# Patient Record
Sex: FEMALE | Race: WHITE | Employment: STUDENT | URBAN - METROPOLITAN AREA
[De-identification: names, ages, dates, MRNs, and addresses within clinical notes are randomized per-mention and may not be internally consistent; named-entity substitution may affect disease eponyms.]

---
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
## 2022-03-16 ENCOUNTER — TRANSCRIBE ORDER (OUTPATIENT)
Dept: SCHEDULING | Age: 22
End: 2022-03-16

## 2022-03-16 DIAGNOSIS — E04.9 ENLARGED THYROID: Primary | ICD-10-CM

## 2024-06-12 ENCOUNTER — OFFICE VISIT (OUTPATIENT)
Age: 24
End: 2024-06-12
Payer: COMMERCIAL

## 2024-06-12 VITALS
WEIGHT: 182 LBS | OXYGEN SATURATION: 99 % | HEIGHT: 63 IN | SYSTOLIC BLOOD PRESSURE: 124 MMHG | BODY MASS INDEX: 32.25 KG/M2 | DIASTOLIC BLOOD PRESSURE: 80 MMHG

## 2024-06-12 DIAGNOSIS — R55 SYNCOPE AND COLLAPSE: Primary | ICD-10-CM

## 2024-06-12 DIAGNOSIS — Z76.89 ENCOUNTER TO ESTABLISH CARE: ICD-10-CM

## 2024-06-12 DIAGNOSIS — F41.9 ANXIETY: ICD-10-CM

## 2024-06-12 PROCEDURE — 93000 ELECTROCARDIOGRAM COMPLETE: CPT | Performed by: SPECIALIST

## 2024-06-12 PROCEDURE — 99244 OFF/OP CNSLTJ NEW/EST MOD 40: CPT | Performed by: SPECIALIST

## 2024-06-12 ASSESSMENT — PATIENT HEALTH QUESTIONNAIRE - PHQ9
SUM OF ALL RESPONSES TO PHQ QUESTIONS 1-9: 0
SUM OF ALL RESPONSES TO PHQ QUESTIONS 1-9: 0
SUM OF ALL RESPONSES TO PHQ9 QUESTIONS 1 & 2: 0
SUM OF ALL RESPONSES TO PHQ QUESTIONS 1-9: 0
SUM OF ALL RESPONSES TO PHQ QUESTIONS 1-9: 0
1. LITTLE INTEREST OR PLEASURE IN DOING THINGS: NOT AT ALL
2. FEELING DOWN, DEPRESSED OR HOPELESS: NOT AT ALL

## 2024-06-12 NOTE — PROGRESS NOTES
HISTORY OF PRESENT ILLNESS  Gabbie Quezada is a 24 y.o. female   She is referred for syncope and collapse.  For all of her life she will have episodes of passing out or nearly passing out if she is out in the heat for a long period of time or hot tub.  She has never harmed herself with any of the spells.  She also can have dizziness if she stands up quickly.  Recently she was working on a farm with animals in the heat and became dizzy and weak and had to sit down.  It seemed like her vision became dark but she could hear those around her and then she recovered.  She has a history of anxiety and a tic disorder for which she saw a neurologist for years ago.  She currently takes Zoloft.  She does not smoke cigarettes or drink alcohol and denies chest pain or shortness of breath.  She can walk 2 or 3 times a week for 2 miles at a time.  She admits to being anxious.  HPI     Specialty Problems    None     Current Outpatient Medications   Medication Instructions    acetaminophen (TYLENOL) 325 MG tablet Oral, EVERY 4 HOURS PRN    etonogestrel (NEXPLANON) 68 MG implant SubCUTAneous    ibuprofen (ADVIL;MOTRIN) 200 MG tablet Oral    magnesium oxide (MAG-OX) 400 mg, Oral, 2 TIMES DAILY    Riboflavin 400 mg, Oral, DAILY    sertraline (ZOLOFT) 50 mg, Oral, DAILY      Allergies   Allergen Reactions    Penicillins      Past Medical History:   Diagnosis Date    Anxiety disorder     Depression     Headache      History reviewed. No pertinent surgical history.  History reviewed. No pertinent family history.  Social History     Tobacco Use    Smoking status: Never    Smokeless tobacco: Not on file   Substance Use Topics    Alcohol use: Not Currently       Review of Systems   Neurological:  Positive for dizziness.   Psychiatric/Behavioral:  The patient is nervous/anxious.    All other systems reviewed and are negative.       /80 (Site: Left Upper Arm, Position: Sitting, Cuff Size: Large Adult)   Ht 1.6 m (5' 3\")   Wt 82.6 kg